# Patient Record
Sex: MALE | Race: WHITE | NOT HISPANIC OR LATINO | URBAN - NONMETROPOLITAN AREA
[De-identification: names, ages, dates, MRNs, and addresses within clinical notes are randomized per-mention and may not be internally consistent; named-entity substitution may affect disease eponyms.]

---

## 2017-08-07 ENCOUNTER — IMPORTED ENCOUNTER (OUTPATIENT)
Dept: URBAN - NONMETROPOLITAN AREA CLINIC 1 | Facility: CLINIC | Age: 72
End: 2017-08-07

## 2017-08-07 PROBLEM — H25.13: Noted: 2017-08-07

## 2017-08-07 PROBLEM — H52.4: Noted: 2017-08-07

## 2017-08-07 PROCEDURE — 92015 DETERMINE REFRACTIVE STATE: CPT

## 2017-08-07 PROCEDURE — 99204 OFFICE O/P NEW MOD 45 MIN: CPT

## 2017-08-07 NOTE — PATIENT DISCUSSION
[facility] OUDiscussed diagnosis with patientReviewed symptoms related to cataract progressionDiscussed various treatment options with patientPatient defers treatment at this timeContinue to monitorPresbyopia OUDiscussed diagnosis in detail with patientNew glasses Rx given todayContinue to monitorRTC 1 year complete; 's Notes: MR 8/7/17DFE 8/7/17

## 2018-08-13 ENCOUNTER — IMPORTED ENCOUNTER (OUTPATIENT)
Dept: URBAN - NONMETROPOLITAN AREA CLINIC 1 | Facility: CLINIC | Age: 73
End: 2018-08-13

## 2018-08-13 PROCEDURE — 92014 COMPRE OPH EXAM EST PT 1/>: CPT

## 2018-08-13 PROCEDURE — 92015 DETERMINE REFRACTIVE STATE: CPT

## 2018-08-13 NOTE — PATIENT DISCUSSION
[facility] OUDiscussed diagnosis with patientReviewed symptoms related to cataract progressionDiscussed various treatment options with patientRecommend cataract evaluation by Dr. Radha Polk agrees with planPresbyopia OUDiscussed refractive status in detail with patientNew glasses Rx given today but do not recommend updating glasses due to cataract progressionContinue to monitor; Dr's Notes: MR 8/13/18DFE 8/13/18

## 2018-10-03 ENCOUNTER — IMPORTED ENCOUNTER (OUTPATIENT)
Dept: URBAN - NONMETROPOLITAN AREA CLINIC 1 | Facility: CLINIC | Age: 73
End: 2018-10-03

## 2018-10-03 PROBLEM — H25.11: Noted: 2018-10-03

## 2018-10-03 PROBLEM — H25.12: Noted: 2018-10-03

## 2018-10-03 PROCEDURE — 99214 OFFICE O/P EST MOD 30 MIN: CPT

## 2018-10-03 NOTE — PATIENT DISCUSSION
Cataract(s)-Visually significant cataract OU .-Cataract(s) causing symptomatic impairment of visual function not correctable with a tolerable change in glasses or contact lenses lighting or non-operative means resulting in specific activity limitations and/or participation restrictions including but not limited to reading viewing television driving or meeting vocational or recreational needs. -Expectation is clearer vision and functional improvement in symptoms as well as reduced glare disability after cataract removal.-Order IOLMaster and OPD today. -Recommend standard/traditional based on today's OPD testing and lifestyle questionnaire.-All questions were answered regarding surgery including pre and post-op medications appointments activity restrictions and anesthetic usage.-The risks benefits and alternatives and special risk factors for the patient were discussed in detail including but not limited to: bleeding infection retinal detachment vitreous loss problems with the implant and possible need for additional surgery.-Although rare the possibility of complete vision loss was discussed.-The possible need for glasses post-operatively was discussed.-Order H&P based on history of -Patient elects to proceed with cataract surgery OD  . Will schedule at patient's convenience and re-evaluate OS  in the future. Ptosis/Bleph:-Ptosis (the upper eyelid being in a lower than normal position) of the upper eyelid was explained to the patient. - Discussed with patient after cataract surgery we can revisit having ptosis repair with skin excision if patient feels like he noticed it more once his vision in better. - will have patient let us know after cataract surgery if he wants to continue with ptoisis repair we will schedule VF and photos at patients covenience.; 's Notes: MR 8/13/18DFE 8/13/18

## 2018-10-29 ENCOUNTER — IMPORTED ENCOUNTER (OUTPATIENT)
Dept: URBAN - NONMETROPOLITAN AREA CLINIC 1 | Facility: CLINIC | Age: 73
End: 2018-10-29

## 2018-10-29 PROBLEM — E78.5: Noted: 2018-10-29

## 2018-10-29 PROBLEM — I10: Noted: 2018-10-29

## 2018-10-29 PROBLEM — Z01.818: Noted: 2018-10-29

## 2018-10-29 PROCEDURE — 99214 OFFICE O/P EST MOD 30 MIN: CPT

## 2018-11-06 ENCOUNTER — IMPORTED ENCOUNTER (OUTPATIENT)
Dept: URBAN - NONMETROPOLITAN AREA CLINIC 1 | Facility: CLINIC | Age: 73
End: 2018-11-06

## 2018-11-06 PROBLEM — Z98.41: Noted: 2018-11-06

## 2018-11-06 PROBLEM — Z98.42: Noted: 2018-11-14

## 2018-11-06 PROBLEM — H25.12: Noted: 2018-11-06

## 2018-11-06 PROCEDURE — 99024 POSTOP FOLLOW-UP VISIT: CPT

## 2018-11-06 PROCEDURE — 92136 OPHTHALMIC BIOMETRY: CPT

## 2018-11-06 PROCEDURE — 66984 XCAPSL CTRC RMVL W/O ECP: CPT

## 2018-11-06 NOTE — PATIENT DISCUSSION
Cataract(s)-Visually significant cataract OS . -Cataract(s) causing symptomatic impairment of visual function not correctable with a tolerable change in glasses or contact lenses lighting or non-operative means resulting in specific activity limitations and/or participation restrictions including but not limited to reading viewing television driving or meeting vocational or recreational needs. -Expectation is clearer vision and functional improvement in symptoms as well as reduced glare disability after cataract removal.-All questions were answered regarding surgery including pre and post-op medications appointments activity restrictions and anesthetic usage.-The risks benefits and alternatives and special risk factors for the patient were discussed in detail including but not limited to: bleeding infection retinal detachment vitreous loss problems with the implant and possible need for additional surgery.-Although rare the possibility of complete vision loss was discussed.-The need for glasses post-operatively was discussed.-Patient elects to proceed with cataract surgery OS. Will schedule at patient's convenience. s/p PCIOL Same Day POV Ce OD Standard 11/6/18 -Pt doing well s/p PCIOL. -Continue post-op gtts according to instruction sheet and sleep with eye shield over eye for 7 nights.-Avoid bending at the waist lifting anything over 5lbs and dirty or carmen environments.; 's Notes: MR 8/13/18DFE 8/13/18

## 2018-11-14 ENCOUNTER — IMPORTED ENCOUNTER (OUTPATIENT)
Dept: URBAN - NONMETROPOLITAN AREA CLINIC 1 | Facility: CLINIC | Age: 73
End: 2018-11-14

## 2018-11-14 PROCEDURE — 99024 POSTOP FOLLOW-UP VISIT: CPT

## 2018-11-14 NOTE — PATIENT DISCUSSION
PO Cataract extraction OS 11/13/18 OD 11/6/18 (Standard IOL):  -  The pt has undergone successful cataract extraction with Intraocular lens implantation in both eyes now. -  PO examination is normal and visual acuity has improved. -  Instructed patient not to rub the eye and don't go swimming. -  Pt should return in 1 week for follow up.  -  Ocular meds plan discussed and patient received printed take home instructions.; 's Notes: MR 8/13/18DFE 8/13/18

## 2018-11-19 ENCOUNTER — IMPORTED ENCOUNTER (OUTPATIENT)
Dept: URBAN - NONMETROPOLITAN AREA CLINIC 1 | Facility: CLINIC | Age: 73
End: 2018-11-19

## 2018-11-19 PROCEDURE — 99024 POSTOP FOLLOW-UP VISIT: CPT

## 2018-11-19 NOTE — PATIENT DISCUSSION
PO Cataract extraction OS 11/13/18 OD 11/6/18 (Standard IOL):  -  The pt has undergone successful cataract extraction with Intraocular lens implantation in both eyes now. -  PO examination is normal and visual acuity has improved. -  Instructed patient not to rub the eye and don't go swimming. -  Pt should return in 1 week for follow up. -  Continue all post op drops as directed. - Continue to monitor.- RTC in 3 weeks for POV with THIEN Turk's Notes:  8/13/18DFE 8/13/18

## 2018-12-10 ENCOUNTER — IMPORTED ENCOUNTER (OUTPATIENT)
Dept: URBAN - NONMETROPOLITAN AREA CLINIC 1 | Facility: CLINIC | Age: 73
End: 2018-12-10

## 2018-12-10 PROBLEM — Z98.41: Noted: 2018-11-06

## 2018-12-10 PROBLEM — Z98.42: Noted: 2018-11-14

## 2018-12-10 PROBLEM — H26.491: Noted: 2018-12-10

## 2018-12-10 PROCEDURE — 99024 POSTOP FOLLOW-UP VISIT: CPT

## 2018-12-10 PROCEDURE — 92015 DETERMINE REFRACTIVE STATE: CPT

## 2018-12-10 NOTE — PATIENT DISCUSSION
PO Cataract extraction OS 11/13/18 OD 11/6/18 (Standard IOL):  - The pt has undergone successful cataract extraction with Intraocular lens implantation in both eyes now. - PO examination is normal and visual acuity has improved. - Both intraocular implants in place and stable- Mild PCO noted OD but not visually significant. - MR done today; new glasses Rx given today - Continue to monitor.- RTC in 3 month for follow up with dilation; 's Notes: MR 12/10/18DFE 8/13/18

## 2019-04-19 ENCOUNTER — IMPORTED ENCOUNTER (OUTPATIENT)
Dept: URBAN - NONMETROPOLITAN AREA CLINIC 1 | Facility: CLINIC | Age: 74
End: 2019-04-19

## 2019-04-19 PROBLEM — H26.493: Noted: 2019-04-19

## 2019-04-19 PROBLEM — Z96.1: Noted: 2019-04-19

## 2019-04-19 PROCEDURE — 92012 INTRM OPH EXAM EST PATIENT: CPT

## 2019-04-19 NOTE — PATIENT DISCUSSION
P/C IOL OUDiscussed diagnosis in detail with patient. Both intraocular implants in place and stable. Mild PCO noted OU but not visually significant. Continue to monitor.; 's Notes: MR 12/10/18DFE  4/19/19

## 2020-06-26 ENCOUNTER — IMPORTED ENCOUNTER (OUTPATIENT)
Dept: URBAN - NONMETROPOLITAN AREA CLINIC 1 | Facility: CLINIC | Age: 75
End: 2020-06-26

## 2020-06-26 ENCOUNTER — PREPPED CHART (OUTPATIENT)
Dept: URBAN - NONMETROPOLITAN AREA CLINIC 1 | Facility: CLINIC | Age: 75
End: 2020-06-26

## 2020-06-26 PROBLEM — Z96.1: Noted: 2020-06-26

## 2020-06-26 PROBLEM — H52.4: Noted: 2020-06-26

## 2020-06-26 PROBLEM — H26.493: Noted: 2020-06-26

## 2020-06-26 PROCEDURE — 92015 DETERMINE REFRACTIVE STATE: CPT

## 2020-06-26 PROCEDURE — 92014 COMPRE OPH EXAM EST PT 1/>: CPT

## 2020-06-26 NOTE — PATIENT DISCUSSION
P/C IOL OUDiscussed diagnosis in detail with patient. Both intraocular implants in place and stable. Mild PCO noted OU but not visually significant. Continue to monitor. Presbyopia OUDiscussed refractive status in detail with patient. New glasses Rx given today. Continue to monitor.

## 2020-06-26 NOTE — PATIENT DISCUSSION
Discussed findings in detail w/ pt today. Signs/symptoms associated with progression discussed, including decreased vision and/or glare complaint. Not yet visually significant. Continue to monitor PRN.

## 2022-02-08 ASSESSMENT — TONOMETRY
OS_IOP_MMHG: 10
OD_IOP_MMHG: 10

## 2022-02-08 ASSESSMENT — VISUAL ACUITY
OD_SC: 20/25+2
OS_SC: 20/30-

## 2022-02-10 ENCOUNTER — COMPREHENSIVE EXAM (OUTPATIENT)
Dept: URBAN - NONMETROPOLITAN AREA CLINIC 1 | Facility: CLINIC | Age: 77
End: 2022-02-10

## 2022-02-10 DIAGNOSIS — H26.493: ICD-10-CM

## 2022-02-10 DIAGNOSIS — H52.4: ICD-10-CM

## 2022-02-10 PROCEDURE — 92015 DETERMINE REFRACTIVE STATE: CPT

## 2022-02-10 PROCEDURE — 99214 OFFICE O/P EST MOD 30 MIN: CPT

## 2022-02-10 ASSESSMENT — TONOMETRY
OD_IOP_MMHG: 10
OS_IOP_MMHG: 10

## 2022-02-10 ASSESSMENT — VISUAL ACUITY
OS_SC: 20/25-2
OD_SC: 20/20

## 2022-02-12 ASSESSMENT — TONOMETRY
OD_IOP_MMHG: 14
OS_IOP_MMHG: 13
OS_IOP_MMHG: 14
OD_IOP_MMHG: 14
OD_IOP_MMHG: 14
OS_IOP_MMHG: 14
OD_IOP_MMHG: 14
OS_IOP_MMHG: 12
OD_IOP_MMHG: 16
OD_IOP_MMHG: 14
OS_IOP_MMHG: 13
OD_IOP_MMHG: 12
OS_IOP_MMHG: 15
OS_IOP_MMHG: 13

## 2022-02-12 ASSESSMENT — VISUAL ACUITY
OS_CC: 20/30-/+
OD_CC: 20/30-2
OS_PH: 20/30
OS_GLARE: 20/70
OD_CC: 20/30-
OS_CC: 20/30
OD_CC: 20/25+1
OS_CC: 20/40
OD_CC: 20/400
OD_GLARE: 20/60
OD_PAM: 20/25
OS_CC: J7
OS_CC: 20/20-
OD_GLARE: 20/60
OU_CC: J1+
OU_CC: 20/25
OD_CC: 20/30-
OS_CC: 20/30+
OS_AM: 20/30
OD_CC: 20/30-1
OS_CC: 20/50+
OS_GLARE: 20/70
OS_AM: 20/30
OS_CC: 20/50
OD_CC: J2
OD_CC: 20/22
OS_CC: 20/40+2
OS_PH: 20/40
OD_CC: 20/30-1
OS_GLARE: 20/70
OS_PH: 20/40+

## 2022-02-12 ASSESSMENT — KERATOMETRY
OS_K1POWER_DIOPTERS: -0.50
OD_AXISANGLE_DEGREES: 096
OD_K2POWER_DIOPTERS: -0.25
OD_K1POWER_DIOPTERS: -0.75

## 2022-04-15 ASSESSMENT — VISUAL ACUITY
OD_CC: 20/25+2
OD_CC: 20/20
OS_CC: 20/30-
OS_CC: 20/25

## 2022-04-15 ASSESSMENT — TONOMETRY
OD_IOP_MMHG: 09
OD_IOP_MMHG: 10
OS_IOP_MMHG: 10
OS_IOP_MMHG: 10

## 2024-02-09 ENCOUNTER — FOLLOW UP (OUTPATIENT)
Dept: URBAN - NONMETROPOLITAN AREA CLINIC 1 | Facility: CLINIC | Age: 79
End: 2024-02-09

## 2024-02-09 DIAGNOSIS — H26.493: ICD-10-CM

## 2024-02-09 DIAGNOSIS — H43.813: ICD-10-CM

## 2024-02-09 DIAGNOSIS — H35.373: ICD-10-CM

## 2024-02-09 PROCEDURE — 99213 OFFICE O/P EST LOW 20 MIN: CPT

## 2024-02-09 ASSESSMENT — VISUAL ACUITY
OD_SC: 20/22-1
OS_SC: 20/29

## 2024-02-09 ASSESSMENT — TONOMETRY
OD_IOP_MMHG: 12
OS_IOP_MMHG: 12

## 2025-02-10 ENCOUNTER — FOLLOW UP (OUTPATIENT)
Age: 80
End: 2025-02-10

## 2025-02-10 DIAGNOSIS — H26.493: ICD-10-CM

## 2025-02-10 DIAGNOSIS — H52.4: ICD-10-CM

## 2025-02-10 DIAGNOSIS — H43.813: ICD-10-CM

## 2025-02-10 DIAGNOSIS — H35.373: ICD-10-CM

## 2025-02-10 DIAGNOSIS — Z96.1: ICD-10-CM

## 2025-02-10 PROCEDURE — 92015 DETERMINE REFRACTIVE STATE: CPT

## 2025-02-10 PROCEDURE — 92134 CPTRZ OPH DX IMG PST SGM RTA: CPT

## 2025-02-10 PROCEDURE — 99214 OFFICE O/P EST MOD 30 MIN: CPT
